# Patient Record
Sex: FEMALE | Race: WHITE | URBAN - METROPOLITAN AREA
[De-identification: names, ages, dates, MRNs, and addresses within clinical notes are randomized per-mention and may not be internally consistent; named-entity substitution may affect disease eponyms.]

---

## 2023-04-04 LAB
ALANINE AMINOTRANSFERASE (SGPT) (U/L) IN SER/PLAS: 9 U/L (ref 7–45)
ALBUMIN (G/DL) IN SER/PLAS: 4.3 G/DL (ref 3.4–5)
ALKALINE PHOSPHATASE (U/L) IN SER/PLAS: 54 U/L (ref 33–136)
ANION GAP IN SER/PLAS: 13 MMOL/L (ref 10–20)
ASPARTATE AMINOTRANSFERASE (SGOT) (U/L) IN SER/PLAS: 14 U/L (ref 9–39)
BILIRUBIN TOTAL (MG/DL) IN SER/PLAS: 0.6 MG/DL (ref 0–1.2)
CALCIUM (MG/DL) IN SER/PLAS: 9.6 MG/DL (ref 8.6–10.6)
CARBON DIOXIDE, TOTAL (MMOL/L) IN SER/PLAS: 26 MMOL/L (ref 21–32)
CHLORIDE (MMOL/L) IN SER/PLAS: 106 MMOL/L (ref 98–107)
CHOLESTEROL (MG/DL) IN SER/PLAS: 168 MG/DL (ref 0–199)
CHOLESTEROL IN HDL (MG/DL) IN SER/PLAS: 71.9 MG/DL
CHOLESTEROL/HDL RATIO: 2.3
CREATININE (MG/DL) IN SER/PLAS: 0.98 MG/DL (ref 0.5–1.05)
GFR FEMALE: 56 ML/MIN/1.73M2
GLUCOSE (MG/DL) IN SER/PLAS: 119 MG/DL (ref 74–99)
LDL: 71 MG/DL (ref 0–99)
POTASSIUM (MMOL/L) IN SER/PLAS: 4.4 MMOL/L (ref 3.5–5.3)
PROTEIN TOTAL: 6.7 G/DL (ref 6.4–8.2)
SODIUM (MMOL/L) IN SER/PLAS: 141 MMOL/L (ref 136–145)
THYROTROPIN (MIU/L) IN SER/PLAS BY DETECTION LIMIT <= 0.05 MIU/L: 0.52 MIU/L (ref 0.44–3.98)
TRIGLYCERIDE (MG/DL) IN SER/PLAS: 125 MG/DL (ref 0–149)
UREA NITROGEN (MG/DL) IN SER/PLAS: 23 MG/DL (ref 6–23)
VLDL: 25 MG/DL (ref 0–40)

## 2023-09-07 PROBLEM — R06.83 SNORING: Status: ACTIVE | Noted: 2023-09-07

## 2023-09-07 PROBLEM — R06.00 DYSPNEA: Status: ACTIVE | Noted: 2023-09-07

## 2023-09-07 PROBLEM — F32.A DEPRESSION: Status: ACTIVE | Noted: 2023-09-07

## 2023-09-07 PROBLEM — R73.9 HYPERGLYCEMIA: Status: ACTIVE | Noted: 2023-09-07

## 2023-09-07 PROBLEM — L57.0 ACTINIC KERATOSIS: Status: ACTIVE | Noted: 2023-09-07

## 2023-09-07 PROBLEM — R60.0 LEG EDEMA: Status: ACTIVE | Noted: 2023-09-07

## 2023-09-07 PROBLEM — E66.3 OVERWEIGHT (BMI 25.0-29.9): Status: ACTIVE | Noted: 2023-09-07

## 2023-09-07 PROBLEM — R73.09 IMPAIRED GLUCOSE METABOLISM: Status: ACTIVE | Noted: 2023-09-07

## 2023-09-07 PROBLEM — R06.01 ORTHOPNEA: Status: ACTIVE | Noted: 2023-09-07

## 2023-09-07 PROBLEM — D49.2: Status: ACTIVE | Noted: 2023-09-07

## 2023-09-07 PROBLEM — Z79.899 HIGH RISK MEDICATION USE: Status: ACTIVE | Noted: 2023-09-07

## 2023-09-07 PROBLEM — R42 VERTIGO: Status: ACTIVE | Noted: 2023-09-07

## 2023-09-07 PROBLEM — M81.0 OSTEOPOROSIS: Status: ACTIVE | Noted: 2023-09-07

## 2023-09-07 PROBLEM — E66.3 OVERWEIGHT WITH BODY MASS INDEX (BMI) OF 25 TO 25.9 IN ADULT: Status: ACTIVE | Noted: 2023-09-07

## 2023-09-07 PROBLEM — E78.5 HLD (HYPERLIPIDEMIA): Status: ACTIVE | Noted: 2023-09-07

## 2023-09-07 PROBLEM — M79.673 FOOT PAIN: Status: ACTIVE | Noted: 2023-09-07

## 2023-09-07 PROBLEM — E87.6 HYPOKALEMIA: Status: ACTIVE | Noted: 2023-09-07

## 2023-09-07 PROBLEM — E05.90 HYPERTHYROIDISM: Status: ACTIVE | Noted: 2023-09-07

## 2023-09-07 PROBLEM — E55.9 VITAMIN D DEFICIENCY: Status: ACTIVE | Noted: 2023-09-07

## 2023-09-07 PROBLEM — Z87.891 FORMER SMOKER: Status: ACTIVE | Noted: 2023-09-07

## 2023-09-07 PROBLEM — I10 HTN (HYPERTENSION): Status: ACTIVE | Noted: 2023-09-07

## 2023-09-07 RX ORDER — CHOLECALCIFEROL (VITAMIN D3) 125 MCG
1 CAPSULE ORAL DAILY
COMMUNITY

## 2023-09-07 RX ORDER — ATENOLOL 25 MG/1
1 TABLET ORAL DAILY
COMMUNITY
Start: 2022-10-06 | End: 2023-10-11 | Stop reason: SDUPTHER

## 2023-09-07 RX ORDER — AMLODIPINE BESYLATE 5 MG/1
1 TABLET ORAL DAILY
COMMUNITY
Start: 2021-05-14 | End: 2023-10-11 | Stop reason: ALTCHOICE

## 2023-09-07 RX ORDER — EZETIMIBE 10 MG/1
1 TABLET ORAL DAILY
COMMUNITY
Start: 2021-07-15 | End: 2023-10-11 | Stop reason: SDUPTHER

## 2023-09-07 RX ORDER — ATORVASTATIN CALCIUM 10 MG/1
1 TABLET, FILM COATED ORAL NIGHTLY
COMMUNITY
Start: 2021-05-14 | End: 2023-10-11 | Stop reason: SDUPTHER

## 2023-09-07 RX ORDER — SERTRALINE HYDROCHLORIDE 50 MG/1
1 TABLET, FILM COATED ORAL DAILY
COMMUNITY
Start: 2021-05-14 | End: 2023-10-11 | Stop reason: SDUPTHER

## 2023-09-07 RX ORDER — LOSARTAN POTASSIUM 50 MG/1
1 TABLET ORAL DAILY
COMMUNITY
Start: 2021-07-22 | End: 2023-10-11 | Stop reason: SDUPTHER

## 2023-10-02 ENCOUNTER — LAB (OUTPATIENT)
Dept: LAB | Facility: LAB | Age: 88
End: 2023-10-02
Payer: MEDICARE

## 2023-10-02 DIAGNOSIS — I10 ESSENTIAL (PRIMARY) HYPERTENSION: Primary | ICD-10-CM

## 2023-10-02 DIAGNOSIS — E55.9 VITAMIN D DEFICIENCY, UNSPECIFIED: ICD-10-CM

## 2023-10-02 LAB
25(OH)D3 SERPL-MCNC: 69 NG/ML (ref 30–100)
ALBUMIN SERPL BCP-MCNC: 4.3 G/DL (ref 3.4–5)
ALP SERPL-CCNC: 49 U/L (ref 33–136)
ALT SERPL W P-5'-P-CCNC: 11 U/L (ref 7–45)
ANION GAP SERPL CALC-SCNC: 17 MMOL/L (ref 10–20)
AST SERPL W P-5'-P-CCNC: 15 U/L (ref 9–39)
BILIRUB SERPL-MCNC: 0.5 MG/DL (ref 0–1.2)
BUN SERPL-MCNC: 31 MG/DL (ref 6–23)
CALCIUM SERPL-MCNC: 10.1 MG/DL (ref 8.6–10.6)
CHLORIDE SERPL-SCNC: 104 MMOL/L (ref 98–107)
CHOLEST SERPL-MCNC: 165 MG/DL (ref 0–199)
CHOLESTEROL/HDL RATIO: 2.4
CO2 SERPL-SCNC: 25 MMOL/L (ref 21–32)
CREAT SERPL-MCNC: 0.92 MG/DL (ref 0.5–1.05)
GFR SERPL CREATININE-BSD FRML MDRD: 60 ML/MIN/1.73M*2
GLUCOSE SERPL-MCNC: 106 MG/DL (ref 74–99)
HDLC SERPL-MCNC: 67.9 MG/DL
LDLC SERPL CALC-MCNC: 74 MG/DL (ref 140–190)
NON HDL CHOLESTEROL: 97 MG/DL (ref 0–149)
POTASSIUM SERPL-SCNC: 4.3 MMOL/L (ref 3.5–5.3)
PROT SERPL-MCNC: 7 G/DL (ref 6.4–8.2)
SODIUM SERPL-SCNC: 142 MMOL/L (ref 136–145)
TRIGL SERPL-MCNC: 117 MG/DL (ref 0–149)
URATE SERPL-MCNC: 6.7 MG/DL (ref 2.3–6.7)
VLDL: 23 MG/DL (ref 0–40)

## 2023-10-02 PROCEDURE — 36415 COLL VENOUS BLD VENIPUNCTURE: CPT

## 2023-10-11 ENCOUNTER — OFFICE VISIT (OUTPATIENT)
Dept: PRIMARY CARE | Facility: CLINIC | Age: 88
End: 2023-10-11
Payer: MEDICARE

## 2023-10-11 VITALS
OXYGEN SATURATION: 97 % | WEIGHT: 148.4 LBS | HEIGHT: 59 IN | HEART RATE: 61 BPM | SYSTOLIC BLOOD PRESSURE: 138 MMHG | DIASTOLIC BLOOD PRESSURE: 70 MMHG | BODY MASS INDEX: 29.92 KG/M2

## 2023-10-11 DIAGNOSIS — M54.6 CHRONIC BILATERAL THORACIC BACK PAIN: Primary | ICD-10-CM

## 2023-10-11 DIAGNOSIS — E78.2 MIXED HYPERLIPIDEMIA: ICD-10-CM

## 2023-10-11 DIAGNOSIS — M54.16 LUMBAR RADICULOPATHY: ICD-10-CM

## 2023-10-11 DIAGNOSIS — F41.9 ANXIETY: ICD-10-CM

## 2023-10-11 DIAGNOSIS — G89.29 CHRONIC BILATERAL THORACIC BACK PAIN: Primary | ICD-10-CM

## 2023-10-11 DIAGNOSIS — I10 PRIMARY HYPERTENSION: ICD-10-CM

## 2023-10-11 PROBLEM — E55.9 HYPOVITAMINOSIS D: Status: ACTIVE | Noted: 2023-10-11

## 2023-10-11 PROBLEM — Z79.899 DRUG THERAPY: Status: ACTIVE | Noted: 2023-10-11

## 2023-10-11 PROBLEM — R73.03 BORDERLINE DIABETIC: Status: ACTIVE | Noted: 2023-10-11

## 2023-10-11 PROBLEM — E78.5 HYPERLIPIDEMIA: Status: ACTIVE | Noted: 2019-01-22

## 2023-10-11 PROBLEM — H61.23 IMPACTED CERUMEN, BILATERAL: Status: ACTIVE | Noted: 2023-07-05

## 2023-10-11 PROBLEM — E66.3 OVER WEIGHT: Status: ACTIVE | Noted: 2023-10-11

## 2023-10-11 PROBLEM — E66.3 OVERWEIGHT: Status: ACTIVE | Noted: 2023-10-11

## 2023-10-11 PROBLEM — E06.3 AUTOIMMUNE HYPOTHYROIDISM: Status: ACTIVE | Noted: 2023-10-11

## 2023-10-11 PROBLEM — F32.A DEPRESSIVE DISORDER: Status: ACTIVE | Noted: 2023-10-11

## 2023-10-11 PROBLEM — M19.90 ARTHRITIS: Status: ACTIVE | Noted: 2023-10-11

## 2023-10-11 PROBLEM — D49.2 NEOPLASM OF SKIN: Status: ACTIVE | Noted: 2023-10-11

## 2023-10-11 PROBLEM — R60.0 EDEMA OF LOWER EXTREMITY: Status: ACTIVE | Noted: 2023-10-11

## 2023-10-11 PROBLEM — E04.0 DIFFUSE NONTOXIC GOITER: Status: ACTIVE | Noted: 2023-10-11

## 2023-10-11 PROCEDURE — 90662 IIV NO PRSV INCREASED AG IM: CPT | Performed by: STUDENT IN AN ORGANIZED HEALTH CARE EDUCATION/TRAINING PROGRAM

## 2023-10-11 PROCEDURE — 1170F FXNL STATUS ASSESSED: CPT | Performed by: STUDENT IN AN ORGANIZED HEALTH CARE EDUCATION/TRAINING PROGRAM

## 2023-10-11 PROCEDURE — G0008 ADMIN INFLUENZA VIRUS VAC: HCPCS | Performed by: STUDENT IN AN ORGANIZED HEALTH CARE EDUCATION/TRAINING PROGRAM

## 2023-10-11 PROCEDURE — G0439 PPPS, SUBSEQ VISIT: HCPCS | Performed by: STUDENT IN AN ORGANIZED HEALTH CARE EDUCATION/TRAINING PROGRAM

## 2023-10-11 PROCEDURE — 3075F SYST BP GE 130 - 139MM HG: CPT | Performed by: STUDENT IN AN ORGANIZED HEALTH CARE EDUCATION/TRAINING PROGRAM

## 2023-10-11 PROCEDURE — 3078F DIAST BP <80 MM HG: CPT | Performed by: STUDENT IN AN ORGANIZED HEALTH CARE EDUCATION/TRAINING PROGRAM

## 2023-10-11 PROCEDURE — 1159F MED LIST DOCD IN RCRD: CPT | Performed by: STUDENT IN AN ORGANIZED HEALTH CARE EDUCATION/TRAINING PROGRAM

## 2023-10-11 PROCEDURE — 99214 OFFICE O/P EST MOD 30 MIN: CPT | Performed by: STUDENT IN AN ORGANIZED HEALTH CARE EDUCATION/TRAINING PROGRAM

## 2023-10-11 RX ORDER — ATENOLOL 25 MG/1
25 TABLET ORAL DAILY
Qty: 90 TABLET | Refills: 1 | Status: SHIPPED | OUTPATIENT
Start: 2023-10-11 | End: 2024-02-19

## 2023-10-11 RX ORDER — ACETAMINOPHEN 500 MG
1 TABLET ORAL DAILY
COMMUNITY
Start: 2015-11-16 | End: 2023-12-08 | Stop reason: ALTCHOICE

## 2023-10-11 RX ORDER — SERTRALINE HYDROCHLORIDE 50 MG/1
50 TABLET, FILM COATED ORAL DAILY
Qty: 90 TABLET | Refills: 1 | Status: SHIPPED | OUTPATIENT
Start: 2023-10-11 | End: 2023-10-11 | Stop reason: SDUPTHER

## 2023-10-11 RX ORDER — EZETIMIBE 10 MG/1
10 TABLET ORAL DAILY
Qty: 90 TABLET | Refills: 1 | Status: SHIPPED | OUTPATIENT
Start: 2023-10-11 | End: 2023-10-11 | Stop reason: SDUPTHER

## 2023-10-11 RX ORDER — LOSARTAN POTASSIUM 50 MG/1
50 TABLET ORAL DAILY
Qty: 90 TABLET | Refills: 1 | Status: SHIPPED | OUTPATIENT
Start: 2023-10-11 | End: 2023-10-11 | Stop reason: SDUPTHER

## 2023-10-11 RX ORDER — LOSARTAN POTASSIUM 50 MG/1
50 TABLET ORAL DAILY
Qty: 90 TABLET | Refills: 1 | Status: SHIPPED | OUTPATIENT
Start: 2023-10-11 | End: 2024-02-19

## 2023-10-11 RX ORDER — ATENOLOL 25 MG/1
25 TABLET ORAL DAILY
Qty: 90 TABLET | Refills: 1 | Status: SHIPPED | OUTPATIENT
Start: 2023-10-11 | End: 2023-10-11 | Stop reason: SDUPTHER

## 2023-10-11 RX ORDER — ATORVASTATIN CALCIUM 10 MG/1
10 TABLET, FILM COATED ORAL NIGHTLY
Qty: 90 TABLET | Refills: 1 | Status: SHIPPED | OUTPATIENT
Start: 2023-10-11 | End: 2023-10-11 | Stop reason: SDUPTHER

## 2023-10-11 RX ORDER — EZETIMIBE 10 MG/1
10 TABLET ORAL DAILY
Qty: 90 TABLET | Refills: 1 | Status: SHIPPED | OUTPATIENT
Start: 2023-10-11 | End: 2024-05-06 | Stop reason: SDUPTHER

## 2023-10-11 RX ORDER — SERTRALINE HYDROCHLORIDE 50 MG/1
50 TABLET, FILM COATED ORAL DAILY
Qty: 90 TABLET | Refills: 1 | Status: SHIPPED | OUTPATIENT
Start: 2023-10-11 | End: 2024-02-19

## 2023-10-11 RX ORDER — ATORVASTATIN CALCIUM 10 MG/1
10 TABLET, FILM COATED ORAL NIGHTLY
Qty: 90 TABLET | Refills: 1 | Status: SHIPPED | OUTPATIENT
Start: 2023-10-11 | End: 2024-02-19

## 2023-10-11 ASSESSMENT — ENCOUNTER SYMPTOMS
COUGH: 0
FREQUENCY: 0
DYSURIA: 0
EYE PAIN: 0
VOMITING: 0
POLYDIPSIA: 0
HEADACHES: 0
DIARRHEA: 0
CONSTIPATION: 0
HALLUCINATIONS: 0
EYE DISCHARGE: 0
ABDOMINAL PAIN: 0
APPETITE CHANGE: 0
PALPITATIONS: 0
WHEEZING: 0
NAUSEA: 0
FEVER: 0
SHORTNESS OF BREATH: 0
OCCASIONAL FEELINGS OF UNSTEADINESS: 1
DEPRESSION: 1
HEMATURIA: 0
SORE THROAT: 0
MYALGIAS: 0
LOSS OF SENSATION IN FEET: 1
FATIGUE: 0

## 2023-10-11 ASSESSMENT — PATIENT HEALTH QUESTIONNAIRE - PHQ9
1. LITTLE INTEREST OR PLEASURE IN DOING THINGS: SEVERAL DAYS
10. IF YOU CHECKED OFF ANY PROBLEMS, HOW DIFFICULT HAVE THESE PROBLEMS MADE IT FOR YOU TO DO YOUR WORK, TAKE CARE OF THINGS AT HOME, OR GET ALONG WITH OTHER PEOPLE: NOT DIFFICULT AT ALL
SUM OF ALL RESPONSES TO PHQ9 QUESTIONS 1 AND 2: 2
2. FEELING DOWN, DEPRESSED OR HOPELESS: SEVERAL DAYS

## 2023-10-11 ASSESSMENT — ACTIVITIES OF DAILY LIVING (ADL)
TAKING_MEDICATION: INDEPENDENT
DRESSING: INDEPENDENT
BATHING: INDEPENDENT
GROCERY_SHOPPING: NEEDS ASSISTANCE
MANAGING_FINANCES: INDEPENDENT
DOING_HOUSEWORK: NEEDS ASSISTANCE

## 2023-10-11 NOTE — PROGRESS NOTES
Subjective   Reason for Visit: Geena Colorado is an 88 y.o. female here for a Medicare Wellness visit.               HPI    Patient Care Team:  Amy Alexis DO as PCP - General  Amy Alexis DO as PCP - [employer] PCP     Cancer:   -Menopause age: 56yo   -Colon (Age > 50): Denies  -PAP (Age > 20): Denies  -Breast (Age > 40): Denies  -Lung (Age 55-80, 30 pack year, cessation within 15 years): Former smoker    Vaccination  Tetnus: Up to date  Shingle (Age > 50): Up to date  Pneumonia 13 & 23 (Age 65): Up to date  Flu: Up to date    Tobacco: Denies  Alcohol: Social  Diabetes:Denies  Lipids: HLD; on Zetia  Cognitive: Denies any Cognitive deficits. No cognitive deficits noted.     Also here for follow-up of hypertension, hyperlipidemia, anxiety depression.  Thought her mood was doing better and subsequently discontinued sertraline however wants to go back on it.  States that there has been some deaths in the family which is making her mood go down slightly.  Would like to go back on sertraline.  Denies any chest pain, shortness breath, headaches.  Has been tolerating her medications well.  Blood work has been reviewed.  Within acceptable limits.  No other concerns today.  Denies any chest pain, shortness of breath, headaches.  Does have some baseline dizziness about the same as previous.  Also some associated radicular symptoms of low back pain.    Review of Systems   Constitutional:  Negative for appetite change, fatigue and fever.   HENT:  Negative for congestion, ear discharge, ear pain, hearing loss and sore throat.    Eyes:  Negative for pain and discharge.   Respiratory:  Negative for cough, shortness of breath and wheezing.    Cardiovascular:  Negative for chest pain, palpitations and leg swelling.   Gastrointestinal:  Negative for abdominal pain, constipation, diarrhea, nausea and vomiting.   Endocrine: Negative for cold intolerance, heat intolerance and polydipsia.   Genitourinary:  Negative  for dysuria, frequency and hematuria.   Musculoskeletal:  Negative for gait problem and myalgias.   Skin:  Negative for rash.   Neurological:  Negative for syncope and headaches.   Psychiatric/Behavioral:  Negative for hallucinations and suicidal ideas.        Objective   Vitals:  There were no vitals taken for this visit.      Physical Exam  Constitutional:       Appearance: Normal appearance.   HENT:      Head: Normocephalic and atraumatic.      Right Ear: External ear normal.      Left Ear: External ear normal.      Nose: Nose normal.      Mouth/Throat:      Mouth: Mucous membranes are moist.   Eyes:      Extraocular Movements: Extraocular movements intact.      Conjunctiva/sclera: Conjunctivae normal.      Pupils: Pupils are equal, round, and reactive to light.   Cardiovascular:      Rate and Rhythm: Normal rate and regular rhythm.      Pulses: Normal pulses.      Heart sounds: Normal heart sounds.   Pulmonary:      Effort: Pulmonary effort is normal.      Breath sounds: Normal breath sounds.   Abdominal:      General: Abdomen is flat.      Palpations: Abdomen is soft.   Neurological:      General: No focal deficit present.      Mental Status: She is alert and oriented to person, place, and time.   Psychiatric:         Mood and Affect: Mood normal.         Behavior: Behavior normal.       Assessment/Plan   1 year follow-up for annual physical.  Preventive screenings as stated above.  6-month follow-up for chronic condition review.  We will do some x-rays of the low back.  I have concerns that it is most likely a nerve impingement leading to her balance.  Once confirmation I will refer patient to physical medicine.  If work-up is negative consider referring to neurology instead.  Problem List Items Addressed This Visit    None

## 2023-10-19 ENCOUNTER — TELEPHONE (OUTPATIENT)
Dept: PRIMARY CARE | Facility: CLINIC | Age: 88
End: 2023-10-19
Payer: MEDICARE

## 2023-10-19 DIAGNOSIS — Z78.0 POST-MENOPAUSAL: ICD-10-CM

## 2023-10-19 DIAGNOSIS — Z12.31 ENCOUNTER FOR SCREENING MAMMOGRAM FOR MALIGNANT NEOPLASM OF BREAST: Primary | ICD-10-CM

## 2023-12-08 ENCOUNTER — TELEMEDICINE (OUTPATIENT)
Dept: PRIMARY CARE | Facility: CLINIC | Age: 88
End: 2023-12-08
Payer: MEDICARE

## 2023-12-08 DIAGNOSIS — U07.1 COVID-19: Primary | ICD-10-CM

## 2023-12-08 PROCEDURE — 99442 PR PHYS/QHP TELEPHONE EVALUATION 11-20 MIN: CPT | Performed by: STUDENT IN AN ORGANIZED HEALTH CARE EDUCATION/TRAINING PROGRAM

## 2023-12-08 RX ORDER — NIRMATRELVIR AND RITONAVIR 300-100 MG
3 KIT ORAL 2 TIMES DAILY
Qty: 30 TABLET | Refills: 0 | Status: SHIPPED | OUTPATIENT
Start: 2023-12-08 | End: 2023-12-13

## 2023-12-08 RX ORDER — BENZONATATE 200 MG/1
200 CAPSULE ORAL 3 TIMES DAILY PRN
Qty: 30 CAPSULE | Refills: 0 | Status: SHIPPED | OUTPATIENT
Start: 2023-12-08 | End: 2023-12-18

## 2023-12-08 ASSESSMENT — ENCOUNTER SYMPTOMS
EYE PAIN: 0
PALPITATIONS: 0
APPETITE CHANGE: 0
COUGH: 1
SHORTNESS OF BREATH: 0
FEVER: 0
MYALGIAS: 0
HALLUCINATIONS: 0
WHEEZING: 0
HEADACHES: 0
EYE DISCHARGE: 0
FREQUENCY: 0
RHINORRHEA: 1
CONSTIPATION: 0
VOMITING: 0
ABDOMINAL PAIN: 0
DIARRHEA: 0
NAUSEA: 0
FATIGUE: 0
DYSURIA: 0
HEMATURIA: 0
SORE THROAT: 0
POLYDIPSIA: 0

## 2023-12-08 NOTE — PROGRESS NOTES
Subjective   Patient ID: Geena Colorado is a 88 y.o. female who presents for covid positive.    Patient tested positive for COVID on 12/7/2023.  Symptoms started on 12/6/2023.  Has been sleeping well.  Denies any chest pain shortness of breath.  Slightly better today.  However due to the week and has some concerns that may get worse.  Denies any other sick contact.   lives with her however he is currently asymptomatic at this point in time.  Has not been tested.  Has symptoms of cough, congestion, rhinorrhea.           Review of Systems   Constitutional:  Negative for appetite change, fatigue and fever.   HENT:  Positive for congestion and rhinorrhea. Negative for ear discharge, ear pain, hearing loss and sore throat.    Eyes:  Negative for pain and discharge.   Respiratory:  Positive for cough. Negative for shortness of breath and wheezing.    Cardiovascular:  Negative for chest pain, palpitations and leg swelling.   Gastrointestinal:  Negative for abdominal pain, constipation, diarrhea, nausea and vomiting.   Endocrine: Negative for cold intolerance, heat intolerance and polydipsia.   Genitourinary:  Negative for dysuria, frequency and hematuria.   Musculoskeletal:  Negative for gait problem and myalgias.   Skin:  Negative for rash.   Neurological:  Negative for syncope and headaches.   Psychiatric/Behavioral:  Negative for hallucinations and suicidal ideas.        Objective   There were no vitals taken for this visit.    Physical Exam  Eyes:      General:         Right eye: Right eye discharge: tessalon.   Neurological:      Mental Status: She is alert.   Psychiatric:         Mood and Affect: Mood normal.       Assessment/Plan   Start the patient on Paxlovid.  Advised patient if she gets chest pain or shortness of breath please go to the emergency room.  Patient verbalized understand the plan with no further questions.  If symptoms are persistent or worsen we will follow-up in 1 week for reevaluation for  proper physical exam.  May need to consider imaging studies at that point in time.

## 2024-02-19 DIAGNOSIS — I10 PRIMARY HYPERTENSION: ICD-10-CM

## 2024-02-19 DIAGNOSIS — E78.2 MIXED HYPERLIPIDEMIA: ICD-10-CM

## 2024-02-19 DIAGNOSIS — F41.9 ANXIETY: ICD-10-CM

## 2024-02-19 RX ORDER — SERTRALINE HYDROCHLORIDE 50 MG/1
50 TABLET, FILM COATED ORAL DAILY
Qty: 90 TABLET | Refills: 3 | Status: SHIPPED | OUTPATIENT
Start: 2024-02-19 | End: 2024-05-06 | Stop reason: SINTOL

## 2024-02-19 RX ORDER — LOSARTAN POTASSIUM 50 MG/1
50 TABLET ORAL DAILY
Qty: 90 TABLET | Refills: 3 | Status: SHIPPED | OUTPATIENT
Start: 2024-02-19

## 2024-02-19 RX ORDER — ATENOLOL 25 MG/1
25 TABLET ORAL DAILY
Qty: 90 TABLET | Refills: 3 | Status: SHIPPED | OUTPATIENT
Start: 2024-02-19

## 2024-02-19 RX ORDER — ATORVASTATIN CALCIUM 10 MG/1
10 TABLET, FILM COATED ORAL NIGHTLY
Qty: 90 TABLET | Refills: 3 | Status: SHIPPED | OUTPATIENT
Start: 2024-02-19

## 2024-04-11 ENCOUNTER — APPOINTMENT (OUTPATIENT)
Dept: PRIMARY CARE | Facility: CLINIC | Age: 89
End: 2024-04-11
Payer: MEDICARE

## 2024-04-15 ENCOUNTER — APPOINTMENT (OUTPATIENT)
Dept: PRIMARY CARE | Facility: CLINIC | Age: 89
End: 2024-04-15
Payer: MEDICARE

## 2024-04-29 ENCOUNTER — TELEPHONE (OUTPATIENT)
Dept: PRIMARY CARE | Facility: CLINIC | Age: 89
End: 2024-04-29
Payer: MEDICARE

## 2024-05-06 ENCOUNTER — OFFICE VISIT (OUTPATIENT)
Dept: PRIMARY CARE | Facility: CLINIC | Age: 89
End: 2024-05-06
Payer: MEDICARE

## 2024-05-06 VITALS
DIASTOLIC BLOOD PRESSURE: 81 MMHG | TEMPERATURE: 98 F | SYSTOLIC BLOOD PRESSURE: 171 MMHG | HEART RATE: 68 BPM | HEIGHT: 59 IN | WEIGHT: 147 LBS | BODY MASS INDEX: 29.64 KG/M2

## 2024-05-06 DIAGNOSIS — E78.2 MIXED HYPERLIPIDEMIA: ICD-10-CM

## 2024-05-06 DIAGNOSIS — F32.A DEPRESSION, UNSPECIFIED DEPRESSION TYPE: Primary | ICD-10-CM

## 2024-05-06 DIAGNOSIS — Z87.891 FORMER SMOKER: ICD-10-CM

## 2024-05-06 DIAGNOSIS — R42 VERTIGO: ICD-10-CM

## 2024-05-06 DIAGNOSIS — Z13.29 THYROID DISORDER SCREENING: ICD-10-CM

## 2024-05-06 DIAGNOSIS — E55.9 VITAMIN D DEFICIENCY: ICD-10-CM

## 2024-05-06 DIAGNOSIS — R05.1 ACUTE COUGH: ICD-10-CM

## 2024-05-06 DIAGNOSIS — I10 PRIMARY HYPERTENSION: ICD-10-CM

## 2024-05-06 PROCEDURE — 99214 OFFICE O/P EST MOD 30 MIN: CPT | Performed by: INTERNAL MEDICINE

## 2024-05-06 PROCEDURE — 1160F RVW MEDS BY RX/DR IN RCRD: CPT | Performed by: INTERNAL MEDICINE

## 2024-05-06 PROCEDURE — 3079F DIAST BP 80-89 MM HG: CPT | Performed by: INTERNAL MEDICINE

## 2024-05-06 PROCEDURE — 3077F SYST BP >= 140 MM HG: CPT | Performed by: INTERNAL MEDICINE

## 2024-05-06 PROCEDURE — 1159F MED LIST DOCD IN RCRD: CPT | Performed by: INTERNAL MEDICINE

## 2024-05-06 RX ORDER — ESCITALOPRAM OXALATE 5 MG/1
5 TABLET ORAL DAILY
Qty: 90 TABLET | Refills: 3 | Status: SHIPPED | OUTPATIENT
Start: 2024-05-06 | End: 2025-05-06

## 2024-05-06 RX ORDER — DOXYCYCLINE 100 MG/1
100 CAPSULE ORAL 2 TIMES DAILY
COMMUNITY
Start: 2024-05-01 | End: 2024-05-11

## 2024-05-06 RX ORDER — PREDNISONE 20 MG/1
20 TABLET ORAL 2 TIMES DAILY
COMMUNITY
Start: 2024-05-01 | End: 2024-05-06 | Stop reason: ALTCHOICE

## 2024-05-06 RX ORDER — BENZONATATE 200 MG/1
200 CAPSULE ORAL
COMMUNITY
Start: 2024-05-01 | End: 2024-05-06

## 2024-05-06 RX ORDER — EZETIMIBE 10 MG/1
10 TABLET ORAL DAILY
Qty: 90 TABLET | Refills: 3 | Status: SHIPPED | OUTPATIENT
Start: 2024-05-06 | End: 2025-05-06

## 2024-05-06 RX ORDER — PROMETHAZINE HYDROCHLORIDE AND DEXTROMETHORPHAN HYDROBROMIDE 6.25; 15 MG/5ML; MG/5ML
5 SYRUP ORAL
COMMUNITY
Start: 2024-05-01

## 2024-05-06 ASSESSMENT — ENCOUNTER SYMPTOMS
COUGH: 1
DIZZINESS: 1
CHILLS: 0
SORE THROAT: 0
DEPRESSION: 0
LOSS OF SENSATION IN FEET: 0
SHORTNESS OF BREATH: 0
FEVER: 0
DYSURIA: 0
OCCASIONAL FEELINGS OF UNSTEADINESS: 0

## 2024-05-06 ASSESSMENT — PATIENT HEALTH QUESTIONNAIRE - PHQ9
10. IF YOU CHECKED OFF ANY PROBLEMS, HOW DIFFICULT HAVE THESE PROBLEMS MADE IT FOR YOU TO DO YOUR WORK, TAKE CARE OF THINGS AT HOME, OR GET ALONG WITH OTHER PEOPLE: SOMEWHAT DIFFICULT
2. FEELING DOWN, DEPRESSED OR HOPELESS: SEVERAL DAYS
SUM OF ALL RESPONSES TO PHQ9 QUESTIONS 1 AND 2: 1
1. LITTLE INTEREST OR PLEASURE IN DOING THINGS: NOT AT ALL

## 2024-05-06 NOTE — PROGRESS NOTES
"Subjective   Patient ID: Geena Colorado is a 88 y.o. female who presents for Establish Care and Cough (X 5 days ).    Cough  Pertinent negatives include no chest pain, chills, fever, rash, sore throat or shortness of breath.        Review of Systems   Constitutional:  Negative for chills and fever.   HENT:  Negative for sore throat.    Respiratory:  Positive for cough. Negative for shortness of breath.    Cardiovascular:  Negative for chest pain.   Genitourinary:  Negative for dysuria.   Skin:  Negative for rash.   Neurological:  Positive for dizziness.       Objective   BP (!) 178/92   Pulse 68   Temp 36.7 °C (98 °F) (Temporal)   Ht 1.486 m (4' 10.5\")   Wt 66.7 kg (147 lb)   BMI 30.20 kg/m²     Physical Exam  Vitals and nursing note reviewed.       Assessment/Plan   Problem List Items Addressed This Visit             ICD-10-CM    Depression - Primary F32.A    Relevant Medications    escitalopram (Lexapro) 5 mg tablet    Vertigo R42    Relevant Orders    Referral to Physical Therapy    Vitamin D deficiency E55.9    Relevant Orders    Vitamin D 25-Hydroxy,Total (for eval of Vitamin D levels)    Former smoker Z87.891    Hyperlipidemia E78.5    Relevant Medications    ezetimibe (Zetia) 10 mg tablet    Other Relevant Orders    CBC and Auto Differential    Comprehensive metabolic panel    Lipid panel    High blood pressure I10    Relevant Orders    CBC and Auto Differential    Thyroid disorder screening Z13.29    Relevant Orders    TSH with reflex to Free T4 if abnormal    Acute cough R05.1          "

## 2024-05-08 ENCOUNTER — TELEPHONE (OUTPATIENT)
Dept: PRIMARY CARE | Facility: CLINIC | Age: 89
End: 2024-05-08
Payer: MEDICARE

## 2024-05-08 NOTE — TELEPHONE ENCOUNTER
Monitoring bp     161/94 seems to go higher in evening  181/96 last evening     They will continue to monitor and let us know on Friday what the trend is. She is still coughing a lot and was coughing at the time of the highest reading       [phone] hans

## 2024-05-17 NOTE — TELEPHONE ENCOUNTER
Spoke with patient. She states that her blood pressure was down in the 148/78. She has not taken it in a couple of days. I asked that she take it while I was on the phone and it is 143/84.

## 2024-05-23 ENCOUNTER — TELEPHONE (OUTPATIENT)
Dept: CARDIOLOGY | Facility: CLINIC | Age: 89
End: 2024-05-23
Payer: MEDICARE

## 2024-05-23 NOTE — TELEPHONE ENCOUNTER
Bp readings  05/18/24 143/80 9:10am                  148/76 1:30pm                   139/71 minutes later                   143/73 evening time  05/19/2024                    139/86  9:00am                     148/77 3:00pm                     128/76 6:56pm  05/20/2024                       122/79 7:40am                       120/63 1:30pm                        142/80 7:00pm  05/21/2024                       126/76 8:00am                        133/79 2:33pm  05/22/2024                        133/73 8:38pm  05/23/2024                         129/68 1:28pm

## 2024-05-23 NOTE — TELEPHONE ENCOUNTER
Overall these are good readings and generally well controlled blood pressures.  I think we will hold off on making any changes at this time unless she notices a change. thanks

## 2024-05-31 PROBLEM — Z13.29 THYROID DISORDER SCREENING: Status: ACTIVE | Noted: 2024-05-31

## 2024-05-31 PROBLEM — R05.1 ACUTE COUGH: Status: ACTIVE | Noted: 2024-05-31

## 2024-06-04 ENCOUNTER — TELEPHONE (OUTPATIENT)
Dept: PRIMARY CARE | Facility: CLINIC | Age: 89
End: 2024-06-04
Payer: MEDICARE

## 2024-06-18 DIAGNOSIS — E55.9 VITAMIN D DEFICIENCY: ICD-10-CM

## 2024-06-18 DIAGNOSIS — I10 PRIMARY HYPERTENSION: ICD-10-CM

## 2024-06-18 DIAGNOSIS — E05.90 SUBCLINICAL HYPERTHYROIDISM: ICD-10-CM

## 2024-06-18 DIAGNOSIS — E78.2 MIXED HYPERLIPIDEMIA: Primary | ICD-10-CM

## 2024-10-17 LAB
NON-UH HIE A/G RATIO: 1.2
NON-UH HIE ALB: 3.7 G/DL (ref 3.4–5)
NON-UH HIE ALK PHOS: 63 UNIT/L (ref 45–117)
NON-UH HIE BILIRUBIN, TOTAL: 0.7 MG/DL (ref 0.3–1.2)
NON-UH HIE BUN/CREAT RATIO: 24
NON-UH HIE BUN: 24 MG/DL (ref 9–23)
NON-UH HIE CALCIUM: 9.7 MG/DL (ref 8.7–10.4)
NON-UH HIE CALCULATED LDL CHOLESTEROL: 66 MG/DL (ref 60–130)
NON-UH HIE CALCULATED OSMOLALITY: 287 MOSM/KG (ref 275–295)
NON-UH HIE CHLORIDE: 107 MMOL/L (ref 98–107)
NON-UH HIE CHOLESTEROL: 155 MG/DL (ref 100–200)
NON-UH HIE CO2, VENOUS: 25 MMOL/L (ref 20–31)
NON-UH HIE CREATININE: 1 MG/DL (ref 0.5–0.8)
NON-UH HIE FREE T4: 1.21 NG/DL (ref 0.89–1.76)
NON-UH HIE GFR AA: >60
NON-UH HIE GLOBULIN: 3.1 G/DL
NON-UH HIE GLOMERULAR FILTRATION RATE: 52 ML/MIN/1.73M?
NON-UH HIE GLUCOSE: 120 MG/DL (ref 74–106)
NON-UH HIE GOT: 16 UNIT/L (ref 15–37)
NON-UH HIE GPT: 10 UNIT/L (ref 10–49)
NON-UH HIE HDL CHOLESTEROL: 69 MG/DL (ref 40–60)
NON-UH HIE K: 4.4 MMOL/L (ref 3.5–5.1)
NON-UH HIE NA: 141 MMOL/L (ref 135–145)
NON-UH HIE TOTAL CHOL/HDL CHOL RATIO: 2.2
NON-UH HIE TOTAL PROTEIN: 6.8 G/DL (ref 5.7–8.2)
NON-UH HIE TRIGLYCERIDES: 101 MG/DL (ref 30–150)
NON-UH HIE TSH: 0.42 UIU/ML (ref 0.55–4.78)
NON-UH HIE VIT D 25: 49 NG/ML

## 2024-11-04 ENCOUNTER — APPOINTMENT (OUTPATIENT)
Dept: PRIMARY CARE | Facility: CLINIC | Age: 89
End: 2024-11-04
Payer: MEDICARE

## 2024-11-04 VITALS
HEIGHT: 58 IN | DIASTOLIC BLOOD PRESSURE: 67 MMHG | SYSTOLIC BLOOD PRESSURE: 153 MMHG | WEIGHT: 147.8 LBS | BODY MASS INDEX: 31.02 KG/M2 | TEMPERATURE: 97.5 F | HEART RATE: 64 BPM

## 2024-11-04 DIAGNOSIS — Z23 ENCOUNTER FOR IMMUNIZATION: ICD-10-CM

## 2024-11-04 DIAGNOSIS — E55.9 VITAMIN D DEFICIENCY: ICD-10-CM

## 2024-11-04 DIAGNOSIS — R73.09 IMPAIRED GLUCOSE METABOLISM: ICD-10-CM

## 2024-11-04 DIAGNOSIS — E78.2 MIXED HYPERLIPIDEMIA: ICD-10-CM

## 2024-11-04 DIAGNOSIS — E05.90 SUBCLINICAL HYPERTHYROIDISM: ICD-10-CM

## 2024-11-04 DIAGNOSIS — Z00.00 HEALTH MAINTENANCE EXAMINATION: Primary | ICD-10-CM

## 2024-11-04 DIAGNOSIS — Z87.891 FORMER SMOKER: ICD-10-CM

## 2024-11-04 DIAGNOSIS — E04.2 MULTIPLE THYROID NODULES: ICD-10-CM

## 2024-11-04 DIAGNOSIS — Z78.0 MENOPAUSE PRESENT: ICD-10-CM

## 2024-11-04 DIAGNOSIS — I10 PRIMARY HYPERTENSION: ICD-10-CM

## 2024-11-04 DIAGNOSIS — M81.0 OSTEOPOROSIS, UNSPECIFIED OSTEOPOROSIS TYPE, UNSPECIFIED PATHOLOGICAL FRACTURE PRESENCE: ICD-10-CM

## 2024-11-04 PROCEDURE — 1036F TOBACCO NON-USER: CPT | Performed by: INTERNAL MEDICINE

## 2024-11-04 PROCEDURE — 99397 PER PM REEVAL EST PAT 65+ YR: CPT | Performed by: INTERNAL MEDICINE

## 2024-11-04 PROCEDURE — G0009 ADMIN PNEUMOCOCCAL VACCINE: HCPCS | Performed by: INTERNAL MEDICINE

## 2024-11-04 PROCEDURE — 1158F ADVNC CARE PLAN TLK DOCD: CPT | Performed by: INTERNAL MEDICINE

## 2024-11-04 PROCEDURE — G0439 PPPS, SUBSEQ VISIT: HCPCS | Performed by: INTERNAL MEDICINE

## 2024-11-04 PROCEDURE — 1170F FXNL STATUS ASSESSED: CPT | Performed by: INTERNAL MEDICINE

## 2024-11-04 PROCEDURE — 90677 PCV20 VACCINE IM: CPT | Performed by: INTERNAL MEDICINE

## 2024-11-04 PROCEDURE — 3078F DIAST BP <80 MM HG: CPT | Performed by: INTERNAL MEDICINE

## 2024-11-04 PROCEDURE — 1123F ACP DISCUSS/DSCN MKR DOCD: CPT | Performed by: INTERNAL MEDICINE

## 2024-11-04 PROCEDURE — 1159F MED LIST DOCD IN RCRD: CPT | Performed by: INTERNAL MEDICINE

## 2024-11-04 PROCEDURE — 99214 OFFICE O/P EST MOD 30 MIN: CPT | Performed by: INTERNAL MEDICINE

## 2024-11-04 PROCEDURE — 1160F RVW MEDS BY RX/DR IN RCRD: CPT | Performed by: INTERNAL MEDICINE

## 2024-11-04 PROCEDURE — 3077F SYST BP >= 140 MM HG: CPT | Performed by: INTERNAL MEDICINE

## 2024-11-04 RX ORDER — AMLODIPINE BESYLATE 5 MG/1
5 TABLET ORAL DAILY
Qty: 90 TABLET | Refills: 3 | Status: SHIPPED | OUTPATIENT
Start: 2024-11-04 | End: 2025-11-04

## 2024-11-04 RX ORDER — AMLODIPINE BESYLATE 5 MG/1
5 TABLET ORAL DAILY
Qty: 30 TABLET | Refills: 11 | Status: SHIPPED | OUTPATIENT
Start: 2024-11-04 | End: 2024-11-04

## 2024-11-04 ASSESSMENT — PATIENT HEALTH QUESTIONNAIRE - PHQ9
SUM OF ALL RESPONSES TO PHQ9 QUESTIONS 1 AND 2: 1
10. IF YOU CHECKED OFF ANY PROBLEMS, HOW DIFFICULT HAVE THESE PROBLEMS MADE IT FOR YOU TO DO YOUR WORK, TAKE CARE OF THINGS AT HOME, OR GET ALONG WITH OTHER PEOPLE: NOT DIFFICULT AT ALL
SUM OF ALL RESPONSES TO PHQ9 QUESTIONS 1 AND 2: 0
1. LITTLE INTEREST OR PLEASURE IN DOING THINGS: NOT AT ALL
2. FEELING DOWN, DEPRESSED OR HOPELESS: NOT AT ALL
1. LITTLE INTEREST OR PLEASURE IN DOING THINGS: NOT AT ALL
2. FEELING DOWN, DEPRESSED OR HOPELESS: SEVERAL DAYS

## 2024-11-04 ASSESSMENT — ENCOUNTER SYMPTOMS
DIZZINESS: 1
ABDOMINAL PAIN: 0
DYSURIA: 0
SHORTNESS OF BREATH: 0
VOMITING: 0
BLOOD IN STOOL: 0
LIGHT-HEADEDNESS: 0
SORE THROAT: 0
FEVER: 0
AGITATION: 0
NAUSEA: 0
COUGH: 0
PALPITATIONS: 0
HEMATURIA: 0
CHILLS: 0
DIARRHEA: 0

## 2024-11-04 ASSESSMENT — ACTIVITIES OF DAILY LIVING (ADL)
DOING_HOUSEWORK: NEEDS ASSISTANCE
DRESSING: INDEPENDENT
MANAGING_FINANCES: INDEPENDENT
TAKING_MEDICATION: INDEPENDENT
BATHING: INDEPENDENT
GROCERY_SHOPPING: INDEPENDENT

## 2024-11-04 NOTE — ASSESSMENT & PLAN NOTE
Orders:    CBC and Auto Differential; Future    Comprehensive metabolic panel; Future    Lipid panel; Future

## 2024-11-04 NOTE — ASSESSMENT & PLAN NOTE
Orders:    CBC and Auto Differential; Future    Comprehensive metabolic panel; Future    amLODIPine (Norvasc) 5 mg tablet; Take 1 tablet (5 mg) by mouth once daily.

## 2024-11-04 NOTE — PROGRESS NOTES
Subjective   Reason for Visit: Geena Colorado is an 89 y.o. female here for a Medicare Wellness visit.     Past Medical, Surgical, and Family History reviewed and updated in chart.    Reviewed all medications by prescribing practitioner or clinical pharmacist (such as prescriptions, OTCs, herbal therapies and supplements) and documented in the medical record.    HPI patient is 89-year-old female presents to the office today for annual wellness visit.  She is up-to-date on age and gender recommended screening with exception of DEXA scan which she would like to pursue.  She is up-to-date on age and gender recommended immunizations exception of pneumonia vaccine which will be updated today also due for shingles vaccine, RSV and COVID-19 which is recommended get her pharmacy.  Has had some issues with elevated blood pressure and would like to go back on amlodipine that she was on previously.  States she was on 5 mg of amlodipine in the past was taken off the medication.  She thinks a higher dose caused some swelling.  Denies any fever, chills, chest pain shortness of breath, nausea or vomiting.  Has issues with dizziness and balance.  Was referred to outpatient balance and vestibular therapy but did not pursue it.  No syncopal episodes.  She also brought a copy of an ultrasound report from 2021 and believes she is due for repeat surveillance of thyroid nodules.      Patient Care Team:  Osmar Hinds DO as PCP - General (Internal Medicine)     Review of Systems   Constitutional:  Negative for chills and fever.   HENT:  Negative for sore throat.    Eyes:  Negative for visual disturbance.   Respiratory:  Negative for cough and shortness of breath.    Cardiovascular:  Negative for chest pain, palpitations and leg swelling.   Gastrointestinal:  Negative for abdominal pain, blood in stool, diarrhea, nausea and vomiting.   Genitourinary:  Negative for dysuria and hematuria.   Skin:  Negative for rash.   Neurological:   "Positive for dizziness. Negative for syncope and light-headedness.   Psychiatric/Behavioral:  Negative for agitation.        Objective   Vitals:  /67 (BP Location: Left arm, Patient Position: Sitting, BP Cuff Size: Adult)   Pulse 64   Temp 36.4 °C (97.5 °F)   Ht 1.473 m (4' 10\")   Wt 67 kg (147 lb 12.8 oz)   BMI 30.89 kg/m²       Physical Exam  Vitals and nursing note reviewed.   Constitutional:       General: She is not in acute distress.     Appearance: Normal appearance. She is obese. She is not ill-appearing, toxic-appearing or diaphoretic.   HENT:      Head: Normocephalic and atraumatic.      Mouth/Throat:      Mouth: Mucous membranes are moist.      Pharynx: Oropharynx is clear. No oropharyngeal exudate.   Eyes:      Pupils: Pupils are equal, round, and reactive to light.   Cardiovascular:      Rate and Rhythm: Normal rate and regular rhythm.      Heart sounds: Normal heart sounds.   Pulmonary:      Effort: Pulmonary effort is normal. No respiratory distress.      Breath sounds: Normal breath sounds. No wheezing, rhonchi or rales.   Abdominal:      General: Bowel sounds are normal. There is no distension.      Palpations: Abdomen is soft.      Tenderness: There is no abdominal tenderness.   Musculoskeletal:      Cervical back: Neck supple.      Right lower leg: No edema.      Left lower leg: No edema.   Lymphadenopathy:      Cervical: No cervical adenopathy.   Skin:     General: Skin is warm and dry.      Coloration: Skin is not jaundiced or pale.      Findings: No rash.   Neurological:      General: No focal deficit present.      Mental Status: She is alert and oriented to person, place, and time.      Cranial Nerves: No cranial nerve deficit.   Psychiatric:         Mood and Affect: Mood normal.         Behavior: Behavior normal.         Thought Content: Thought content normal.         Judgment: Judgment normal.         Assessment & Plan  Multiple thyroid nodules    Orders:    US thyroid; " Future    Primary hypertension    Orders:    CBC and Auto Differential; Future    Comprehensive metabolic panel; Future    amLODIPine (Norvasc) 5 mg tablet; Take 1 tablet (5 mg) by mouth once daily.    Mixed hyperlipidemia    Orders:    CBC and Auto Differential; Future    Comprehensive metabolic panel; Future    Lipid panel; Future    Vitamin D deficiency    Orders:    Vitamin D 25-Hydroxy,Total (for eval of Vitamin D levels); Future    Impaired glucose metabolism    Orders:    Hemoglobin A1C; Future    Subclinical hyperthyroidism    Orders:    TSH with reflex to Free T4 if abnormal; Future    Encounter for immunization    Orders:    Pneumococcal conjugate vaccine, 20-valent (PREVNAR 20)    Menopause present    Orders:    XR DEXA bone density; Future    Osteoporosis, unspecified osteoporosis type, unspecified pathological fracture presence    Orders:    XR DEXA bone density; Future    Health maintenance examination         Former smoker          Health maintenance examination: Patient is up-to-date on age and gender recommended screening exception of DEXA scan which has been ordered.  She is due for pneumonia vaccine with Prevnar 20 which will be updated also recommend to get RSV and shingles vaccine at her pharmacy    Multiple thyroid nodules: Will repeat ultrasound of thyroid for further evaluation she does have evidence of subclinical hyperthyroidism    Hypertension: Chronic, uncontrolled will increase her medication regimen to include amlodipine 5 mg daily continue the rest of her current medications atenolol and losartan    Hyperlipidemia: Chronic, stable patient will be continued on atorvastatin and Zetia    Vitamin D deficiency: Chronic, stable continue current medication regimen    Impaired glucose metabolism: Will check hemoglobin A1c advised against breads, pastas rice potatoes    Subclinical hyperthyroidism: No signs or symptoms of hyperthyroidism at this time we will continue to monitor carefully.   Repeat TSH has been ordered for 6 months

## 2024-12-27 DIAGNOSIS — E78.2 MIXED HYPERLIPIDEMIA: ICD-10-CM

## 2024-12-27 DIAGNOSIS — I10 PRIMARY HYPERTENSION: ICD-10-CM

## 2025-01-02 RX ORDER — ATORVASTATIN CALCIUM 10 MG/1
10 TABLET, FILM COATED ORAL NIGHTLY
Qty: 90 TABLET | Refills: 3 | Status: SHIPPED | OUTPATIENT
Start: 2025-01-02

## 2025-01-02 RX ORDER — ATENOLOL 25 MG/1
25 TABLET ORAL DAILY
Qty: 90 TABLET | Refills: 3 | Status: SHIPPED | OUTPATIENT
Start: 2025-01-02

## 2025-01-02 RX ORDER — LOSARTAN POTASSIUM 50 MG/1
50 TABLET ORAL DAILY
Qty: 90 TABLET | Refills: 3 | Status: SHIPPED | OUTPATIENT
Start: 2025-01-02

## 2025-02-10 ENCOUNTER — TELEPHONE (OUTPATIENT)
Dept: PRIMARY CARE | Facility: CLINIC | Age: 89
End: 2025-02-10
Payer: MEDICARE

## 2025-02-10 DIAGNOSIS — E04.2 MULTIPLE THYROID NODULES: Primary | ICD-10-CM

## 2025-03-07 ENCOUNTER — APPOINTMENT (OUTPATIENT)
Facility: CLINIC | Age: 89
End: 2025-03-07
Payer: MEDICARE

## 2025-03-07 VITALS — WEIGHT: 147 LBS | BODY MASS INDEX: 30.86 KG/M2 | HEIGHT: 58 IN

## 2025-03-07 DIAGNOSIS — E04.2 MULTIPLE THYROID NODULES: ICD-10-CM

## 2025-03-07 DIAGNOSIS — R26.89 IMBALANCE: ICD-10-CM

## 2025-03-07 PROCEDURE — 1159F MED LIST DOCD IN RCRD: CPT | Performed by: OTOLARYNGOLOGY

## 2025-03-07 PROCEDURE — 1160F RVW MEDS BY RX/DR IN RCRD: CPT | Performed by: OTOLARYNGOLOGY

## 2025-03-07 PROCEDURE — 1123F ACP DISCUSS/DSCN MKR DOCD: CPT | Performed by: OTOLARYNGOLOGY

## 2025-03-07 PROCEDURE — 1036F TOBACCO NON-USER: CPT | Performed by: OTOLARYNGOLOGY

## 2025-03-07 PROCEDURE — 99204 OFFICE O/P NEW MOD 45 MIN: CPT | Performed by: OTOLARYNGOLOGY

## 2025-03-07 ASSESSMENT — PATIENT HEALTH QUESTIONNAIRE - PHQ9
2. FEELING DOWN, DEPRESSED OR HOPELESS: SEVERAL DAYS
1. LITTLE INTEREST OR PLEASURE IN DOING THINGS: SEVERAL DAYS
SUM OF ALL RESPONSES TO PHQ9 QUESTIONS 1 AND 2: 2

## 2025-03-11 DIAGNOSIS — E78.2 MIXED HYPERLIPIDEMIA: ICD-10-CM

## 2025-03-12 DIAGNOSIS — R26.89 IMBALANCE: ICD-10-CM

## 2025-03-12 RX ORDER — EZETIMIBE 10 MG/1
10 TABLET ORAL DAILY
Qty: 90 TABLET | Refills: 3 | Status: SHIPPED | OUTPATIENT
Start: 2025-03-12

## 2025-04-01 ENCOUNTER — APPOINTMENT (OUTPATIENT)
Dept: PRIMARY CARE | Facility: CLINIC | Age: 89
End: 2025-04-01
Payer: MEDICARE

## 2025-04-08 ENCOUNTER — APPOINTMENT (OUTPATIENT)
Dept: PRIMARY CARE | Facility: CLINIC | Age: 89
End: 2025-04-08
Payer: MEDICARE

## 2025-04-08 VITALS
HEART RATE: 82 BPM | DIASTOLIC BLOOD PRESSURE: 84 MMHG | SYSTOLIC BLOOD PRESSURE: 143 MMHG | WEIGHT: 145.2 LBS | BODY MASS INDEX: 30.48 KG/M2 | TEMPERATURE: 95.9 F | HEIGHT: 58 IN

## 2025-04-08 DIAGNOSIS — F32.A DEPRESSIVE DISORDER: Primary | ICD-10-CM

## 2025-04-08 DIAGNOSIS — R42 VERTIGO: ICD-10-CM

## 2025-04-08 DIAGNOSIS — E04.2 MULTIPLE THYROID NODULES: ICD-10-CM

## 2025-04-08 DIAGNOSIS — E05.90 SUBCLINICAL HYPERTHYROIDISM: ICD-10-CM

## 2025-04-08 PROCEDURE — G8433 SCR FOR DEP NOT CPT DOC RSN: HCPCS | Performed by: INTERNAL MEDICINE

## 2025-04-08 PROCEDURE — 1158F ADVNC CARE PLAN TLK DOCD: CPT | Performed by: INTERNAL MEDICINE

## 2025-04-08 PROCEDURE — 3077F SYST BP >= 140 MM HG: CPT | Performed by: INTERNAL MEDICINE

## 2025-04-08 PROCEDURE — G2211 COMPLEX E/M VISIT ADD ON: HCPCS | Performed by: INTERNAL MEDICINE

## 2025-04-08 PROCEDURE — 1123F ACP DISCUSS/DSCN MKR DOCD: CPT | Performed by: INTERNAL MEDICINE

## 2025-04-08 PROCEDURE — 1159F MED LIST DOCD IN RCRD: CPT | Performed by: INTERNAL MEDICINE

## 2025-04-08 PROCEDURE — 1036F TOBACCO NON-USER: CPT | Performed by: INTERNAL MEDICINE

## 2025-04-08 PROCEDURE — 3079F DIAST BP 80-89 MM HG: CPT | Performed by: INTERNAL MEDICINE

## 2025-04-08 PROCEDURE — 1160F RVW MEDS BY RX/DR IN RCRD: CPT | Performed by: INTERNAL MEDICINE

## 2025-04-08 PROCEDURE — 99213 OFFICE O/P EST LOW 20 MIN: CPT | Performed by: INTERNAL MEDICINE

## 2025-04-08 RX ORDER — SERTRALINE HYDROCHLORIDE 50 MG/1
50 TABLET, FILM COATED ORAL DAILY
Qty: 90 TABLET | Refills: 1 | Status: SHIPPED | OUTPATIENT
Start: 2025-04-08 | End: 2025-10-05

## 2025-04-08 ASSESSMENT — ENCOUNTER SYMPTOMS
SHORTNESS OF BREATH: 0
VOMITING: 0
DIZZINESS: 1
AGITATION: 0
CONSTIPATION: 0
ABDOMINAL PAIN: 0
CONFUSION: 0
SORE THROAT: 0
DIARRHEA: 0
COUGH: 0
CHILLS: 0
FEVER: 0
LIGHT-HEADEDNESS: 0
NAUSEA: 0

## 2025-04-08 NOTE — PROGRESS NOTES
Subjective   Patient ID: Geena Colorado is a 89 y.o. female who presents for Depression (She used to be on Sertraline, but was switched to Lexapro because she was having diarrhea.  She thought Lexapro was making her dizzy so she stopped it.  This is was stopped in December.  She wants to go back on Sertraline.  ).    HPI  Patient took the Lexapro for 1 month. She stopped it because she thought it was making her dizzy.  Dizziness continues despite stopping the medication.  She is now scheduled for vestibular testing as recommended by ENT..  Patient saw Dr. Keanu Venegas ENT for thyroid nodules.  While at that appointment he had referred the patient for vestibular testing and already has a order for vestibular rehab.  But main reason she is here today is because she would like to go back on sertraline.  She has stopped sertraline and wanted to switch to Lexapro because she believes sertraline was causing her diarrhea.  We looked up her sertraline we had mixed information in terms of it can have up to 20% cases of diarrhea versus 2%.  Up-to-date list that is more likely to cause diarrhea than other SSRIs.  At this point she would like to do a trial back on the medication.  Does have a history of depression and crying spells.  No suicidal or homicidal ideation.  She will be due for labs prior to next office visit.    Review of Systems   Constitutional:  Negative for chills and fever.   HENT:  Negative for sore throat.    Eyes:  Negative for visual disturbance.   Respiratory:  Negative for cough and shortness of breath.    Cardiovascular:  Negative for chest pain.   Gastrointestinal:  Negative for abdominal pain, constipation, diarrhea, nausea and vomiting.   Neurological:  Positive for dizziness. Negative for syncope and light-headedness.   Psychiatric/Behavioral:  Negative for agitation and confusion.        Objective   /84 (BP Location: Left arm, Patient Position: Sitting, BP Cuff Size: Adult)   Pulse 82    "Temp 35.5 °C (95.9 °F)   Ht 1.473 m (4' 10\")   Wt 65.9 kg (145 lb 3.2 oz)   BMI 30.35 kg/m²     Physical Exam  Vitals and nursing note reviewed.   Constitutional:       General: She is not in acute distress.     Appearance: Normal appearance. She is obese. She is not ill-appearing, toxic-appearing or diaphoretic.   HENT:      Head: Normocephalic and atraumatic.   Cardiovascular:      Rate and Rhythm: Normal rate and regular rhythm.      Heart sounds: Normal heart sounds.   Pulmonary:      Effort: Pulmonary effort is normal. No respiratory distress.      Breath sounds: Normal breath sounds. No stridor.   Abdominal:      General: There is no distension.      Palpations: Abdomen is soft.      Tenderness: There is no abdominal tenderness.   Musculoskeletal:      Right lower leg: No edema.      Left lower leg: No edema.   Lymphadenopathy:      Cervical: No cervical adenopathy.   Skin:     General: Skin is warm and dry.      Coloration: Skin is not jaundiced or pale.      Findings: No rash.   Neurological:      General: No focal deficit present.      Mental Status: She is alert and oriented to person, place, and time.      Cranial Nerves: No cranial nerve deficit.   Psychiatric:         Mood and Affect: Mood normal.         Behavior: Behavior normal.         Thought Content: Thought content normal.         Judgment: Judgment normal.         Assessment/Plan   Problem List Items Addressed This Visit             ICD-10-CM    Vertigo R42    Depressive disorder - Primary F32.A    Relevant Medications    sertraline (Zoloft) 50 mg tablet    Subclinical hyperthyroidism E05.90    Multiple thyroid nodules E04.2     Depressive disorder: Patient was restarted on sertraline we will start off with half a tablet or 25 mg for the first 2 to 4 weeks and then increase to 50 mg of the medication this is what she was on previously.  She will watch for symptoms of diarrhea.  If they start with the Zoloft she will let us know she can use " Imodium and see if the diarrhea passes.  Keep follow-up appointment for 1 month from now    Vertigo: She has testing for further evaluation of her vertigo ordered by ENT and also has order for physical therapy vestibular therapy    Subclinical hyperthyroidism: Currently does not have signs or symptoms of hyperthyroidism is scheduled to have repeat labs prior to next office visit in 1 month    Multiple thyroid nodules: At this point patient does have a nodule that it is a candidate for biopsy but she wishes to hold off on biopsy at this time and has chosen surveillance.

## 2025-04-26 LAB
25(OH)D3+25(OH)D2 SERPL-MCNC: 53 NG/ML (ref 30–100)
ALBUMIN SERPL-MCNC: 4.5 G/DL (ref 3.6–5.1)
ALP SERPL-CCNC: 51 U/L (ref 37–153)
ALT SERPL-CCNC: 11 U/L (ref 6–29)
ANION GAP SERPL CALCULATED.4IONS-SCNC: 11 MMOL/L (CALC) (ref 7–17)
AST SERPL-CCNC: 16 U/L (ref 10–35)
BASOPHILS # BLD AUTO: 120 CELLS/UL (ref 0–200)
BASOPHILS NFR BLD AUTO: 1.3 %
BILIRUB SERPL-MCNC: 0.6 MG/DL (ref 0.2–1.2)
BUN SERPL-MCNC: 23 MG/DL (ref 7–25)
CALCIUM SERPL-MCNC: 9.7 MG/DL (ref 8.6–10.4)
CHLORIDE SERPL-SCNC: 103 MMOL/L (ref 98–110)
CHOLEST SERPL-MCNC: 184 MG/DL
CHOLEST/HDLC SERPL: 2.2 (CALC)
CO2 SERPL-SCNC: 23 MMOL/L (ref 20–32)
CREAT SERPL-MCNC: 0.94 MG/DL (ref 0.6–0.95)
EGFRCR SERPLBLD CKD-EPI 2021: 58 ML/MIN/1.73M2
EOSINOPHIL # BLD AUTO: 405 CELLS/UL (ref 15–500)
EOSINOPHIL NFR BLD AUTO: 4.4 %
ERYTHROCYTE [DISTWIDTH] IN BLOOD BY AUTOMATED COUNT: 12.8 % (ref 11–15)
EST. AVERAGE GLUCOSE BLD GHB EST-MCNC: 120 MG/DL
EST. AVERAGE GLUCOSE BLD GHB EST-SCNC: 6.6 MMOL/L
GLUCOSE SERPL-MCNC: 128 MG/DL (ref 65–99)
HBA1C MFR BLD: 5.8 %
HCT VFR BLD AUTO: 44 % (ref 35–45)
HDLC SERPL-MCNC: 85 MG/DL
HGB BLD-MCNC: 14.5 G/DL (ref 11.7–15.5)
LDLC SERPL CALC-MCNC: 81 MG/DL (CALC)
LYMPHOCYTES # BLD AUTO: 3146 CELLS/UL (ref 850–3900)
LYMPHOCYTES NFR BLD AUTO: 34.2 %
MCH RBC QN AUTO: 29.8 PG (ref 27–33)
MCHC RBC AUTO-ENTMCNC: 33 G/DL (ref 32–36)
MCV RBC AUTO: 90.3 FL (ref 80–100)
MONOCYTES # BLD AUTO: 699 CELLS/UL (ref 200–950)
MONOCYTES NFR BLD AUTO: 7.6 %
NEUTROPHILS # BLD AUTO: 4830 CELLS/UL (ref 1500–7800)
NEUTROPHILS NFR BLD AUTO: 52.5 %
NONHDLC SERPL-MCNC: 99 MG/DL (CALC)
PLATELET # BLD AUTO: 290 THOUSAND/UL (ref 140–400)
PMV BLD REES-ECKER: 9.8 FL (ref 7.5–12.5)
POTASSIUM SERPL-SCNC: 4.4 MMOL/L (ref 3.5–5.3)
PROT SERPL-MCNC: 7 G/DL (ref 6.1–8.1)
RBC # BLD AUTO: 4.87 MILLION/UL (ref 3.8–5.1)
SODIUM SERPL-SCNC: 137 MMOL/L (ref 135–146)
TRIGL SERPL-MCNC: 98 MG/DL
TSH SERPL-ACNC: 0.45 MIU/L (ref 0.4–4.5)
WBC # BLD AUTO: 9.2 THOUSAND/UL (ref 3.8–10.8)

## 2025-05-02 ENCOUNTER — APPOINTMENT (OUTPATIENT)
Dept: AUDIOLOGY | Facility: CLINIC | Age: 89
End: 2025-05-02
Payer: MEDICARE

## 2025-05-05 ENCOUNTER — APPOINTMENT (OUTPATIENT)
Dept: PRIMARY CARE | Facility: CLINIC | Age: 89
End: 2025-05-05
Payer: MEDICARE

## 2025-05-05 VITALS
TEMPERATURE: 97.7 F | WEIGHT: 145.6 LBS | SYSTOLIC BLOOD PRESSURE: 130 MMHG | HEIGHT: 59 IN | DIASTOLIC BLOOD PRESSURE: 80 MMHG | BODY MASS INDEX: 29.35 KG/M2 | HEART RATE: 64 BPM

## 2025-05-05 DIAGNOSIS — E55.9 VITAMIN D DEFICIENCY: ICD-10-CM

## 2025-05-05 DIAGNOSIS — E66.3 OVERWEIGHT (BMI 25.0-29.9): ICD-10-CM

## 2025-05-05 DIAGNOSIS — I10 PRIMARY HYPERTENSION: Primary | ICD-10-CM

## 2025-05-05 DIAGNOSIS — E05.90 SUBCLINICAL HYPERTHYROIDISM: ICD-10-CM

## 2025-05-05 DIAGNOSIS — E04.2 MULTIPLE THYROID NODULES: ICD-10-CM

## 2025-05-05 DIAGNOSIS — R73.09 IMPAIRED GLUCOSE METABOLISM: ICD-10-CM

## 2025-05-05 DIAGNOSIS — M81.0 OSTEOPOROSIS, UNSPECIFIED OSTEOPOROSIS TYPE, UNSPECIFIED PATHOLOGICAL FRACTURE PRESENCE: ICD-10-CM

## 2025-05-05 DIAGNOSIS — Z87.891 FORMER SMOKER: ICD-10-CM

## 2025-05-05 DIAGNOSIS — E78.2 MIXED HYPERLIPIDEMIA: ICD-10-CM

## 2025-05-05 DIAGNOSIS — F32.A DEPRESSIVE DISORDER: ICD-10-CM

## 2025-05-05 PROCEDURE — 3079F DIAST BP 80-89 MM HG: CPT | Performed by: INTERNAL MEDICINE

## 2025-05-05 PROCEDURE — 1123F ACP DISCUSS/DSCN MKR DOCD: CPT | Performed by: INTERNAL MEDICINE

## 2025-05-05 PROCEDURE — 99214 OFFICE O/P EST MOD 30 MIN: CPT | Performed by: INTERNAL MEDICINE

## 2025-05-05 PROCEDURE — G2211 COMPLEX E/M VISIT ADD ON: HCPCS | Performed by: INTERNAL MEDICINE

## 2025-05-05 PROCEDURE — 1036F TOBACCO NON-USER: CPT | Performed by: INTERNAL MEDICINE

## 2025-05-05 PROCEDURE — 1158F ADVNC CARE PLAN TLK DOCD: CPT | Performed by: INTERNAL MEDICINE

## 2025-05-05 PROCEDURE — 1160F RVW MEDS BY RX/DR IN RCRD: CPT | Performed by: INTERNAL MEDICINE

## 2025-05-05 PROCEDURE — 3075F SYST BP GE 130 - 139MM HG: CPT | Performed by: INTERNAL MEDICINE

## 2025-05-05 PROCEDURE — 1159F MED LIST DOCD IN RCRD: CPT | Performed by: INTERNAL MEDICINE

## 2025-05-05 RX ORDER — SERTRALINE HYDROCHLORIDE 50 MG/1
25 TABLET, FILM COATED ORAL DAILY
COMMUNITY
Start: 2025-05-05

## 2025-05-05 ASSESSMENT — ENCOUNTER SYMPTOMS
DIZZINESS: 0
NAUSEA: 0
CHILLS: 0
BLOOD IN STOOL: 0
PALPITATIONS: 0
COUGH: 0
AGITATION: 0
SHORTNESS OF BREATH: 0
ABDOMINAL PAIN: 0
DYSURIA: 0
DIARRHEA: 0
FEVER: 0
HEMATURIA: 0
VOMITING: 0
LIGHT-HEADEDNESS: 0
SORE THROAT: 0
CONFUSION: 0

## 2025-05-05 ASSESSMENT — PATIENT HEALTH QUESTIONNAIRE - PHQ9
SUM OF ALL RESPONSES TO PHQ9 QUESTIONS 1 AND 2: 0
2. FEELING DOWN, DEPRESSED OR HOPELESS: NOT AT ALL
1. LITTLE INTEREST OR PLEASURE IN DOING THINGS: NOT AT ALL

## 2025-05-05 NOTE — PROGRESS NOTES
Subjective   Patient ID: Geena Colorado is a 89 y.o. female who presents for Follow-up and Results (Lab review).  History of Present Illness  Geena Colorado is an 89 year old female who presents for routine follow-up and lab review.    Her recent blood work shows an A1c of 5.8%, indicating prediabetes, and a fasting blood sugar of 128 mg/dL. She is currently taking atenolol and atorvastatin for blood pressure and cholesterol management, respectively. She was recently restarted on sertraline at a dose of 25 mg, which she is cutting from a 50 mg tablet, and feels well on this dose.  Depression feels controlled with the current dose.    Her thyroid function tests, blood counts, kidney function, electrolytes, liver enzymes, and cholesterol levels are all within normal limits. She has a history of a thyroid nodule, for which she is under the care of Dr. Thomas.    She has a history of osteoporosis, confirmed by a previous DEXA scan. She is not pursuing treatment for osteoporosis but ensures her vitamin D and calcium levels are adequate. She takes vitamin D supplements, recently switching to a pill form from a gel cap, with a dose of 2000 IU, which maintains her vitamin D levels within the normal range.    Socially, she lives at home and recently let go of her . She has a history of cataract surgery with lens implants for distance and reading.    No abdominal pain or swelling. No recent dermatological issues noted.    Review of Systems   Constitutional:  Negative for chills and fever.   HENT:  Negative for sore throat.    Eyes:  Negative for visual disturbance.   Respiratory:  Negative for cough and shortness of breath.    Cardiovascular:  Negative for chest pain, palpitations and leg swelling.   Gastrointestinal:  Negative for abdominal pain, blood in stool, diarrhea, nausea and vomiting.   Genitourinary:  Negative for dysuria and hematuria.   Skin:  Negative for rash.   Neurological:  Negative for  "dizziness, syncope and light-headedness.   Psychiatric/Behavioral:  Negative for agitation and confusion.        Objective     /80 (BP Location: Left arm, Patient Position: Sitting, BP Cuff Size: Adult)   Pulse 64   Temp 36.5 °C (97.7 °F)   Ht 1.499 m (4' 11\")   Wt 66 kg (145 lb 9.6 oz)   BMI 29.41 kg/m²      Physical Exam  Vitals and nursing note reviewed.   Constitutional:       General: She is not in acute distress.     Appearance: Normal appearance. She is not ill-appearing, toxic-appearing or diaphoretic.   HENT:      Head: Normocephalic and atraumatic.      Nose: No rhinorrhea.      Mouth/Throat:      Mouth: Mucous membranes are moist.      Pharynx: Oropharynx is clear. No oropharyngeal exudate or posterior oropharyngeal erythema.   Eyes:      Extraocular Movements: Extraocular movements intact.      Pupils: Pupils are equal, round, and reactive to light.   Cardiovascular:      Rate and Rhythm: Normal rate and regular rhythm.      Heart sounds: Normal heart sounds.   Pulmonary:      Effort: Pulmonary effort is normal. No respiratory distress.      Breath sounds: Normal breath sounds. No wheezing, rhonchi or rales.   Abdominal:      General: There is no distension.      Palpations: Abdomen is soft. There is no mass.      Tenderness: There is no abdominal tenderness. There is no guarding.   Musculoskeletal:      Cervical back: Neck supple.      Right lower leg: No edema.      Left lower leg: No edema.   Skin:     General: Skin is warm and dry.      Coloration: Skin is not jaundiced or pale.      Findings: No lesion or rash.   Neurological:      General: No focal deficit present.      Mental Status: She is alert and oriented to person, place, and time. Mental status is at baseline.   Psychiatric:         Mood and Affect: Mood normal.         Behavior: Behavior normal.         Thought Content: Thought content normal.         Judgment: Judgment normal.        Physical Exam  VITALS: BP- " "130/80  MEASUREMENTS: Height- 6'3\".    Assessment & Plan    Blood pressure is well-controlled at 130/80 mmHg. Laboratory results show A1c at 5.8%, indicating pre-diabetes, and fasting glucose at 128 mg/dL. Thyroid function, blood counts, kidney function, electrolytes, liver enzymes, and cholesterol levels are within normal limits. She is on atenolol and atorvastatin for hypertension and hyperlipidemia, respectively. Vaccinations are up to date, except for the new shingles vaccine, which she is hesitant to receive due to previous adverse reactions.  - Continue atenolol and atorvastatin.  - Monitor blood glucose levels and dietary intake, focusing on reducing carbohydrate consumption.  - Encourage a balanced diet with green leafy vegetables and protein.  - Discuss the potential benefits and risks of the new shingles vaccine.    Pre-diabetes  A1c is at 5.8%, indicating pre-diabetes. Fasting glucose is slightly elevated at 128 mg/dL. She is advised to monitor carbohydrate intake, particularly rice, bread, and potatoes, to manage blood sugar levels. Artificial sweeteners may increase appetite and contribute to heart disease, so their use should be minimized.  - Monitor carbohydrate intake, focusing on reducing rice, bread, and potatoes.  - Encourage a balanced diet with green leafy vegetables and protein.  - Consider low-sugar protein drinks as a dietary supplement.  - Minimize use of artificial sweeteners.    Osteoporosis  Osteoporosis was noted on the last DEXA scan. She is currently not on treatment and is hesitant to start medication due to potential side effects. Vitamin D levels are adequate, and she is advised to maintain calcium and vitamin D intake. She prefers to focus on fall prevention and is aware of the option to consult a rheumatologist for further treatment if desired.  - Maintain adequate calcium and vitamin D intake.  - Discuss potential osteoporosis treatments with a rheumatologist if she decides to " pursue treatment in the future.  - Advise on fall prevention strategies.    Depressive disorder  She is currently on sertraline 25 mg, which is providing adequate symptom control. She was initially prescribed 50 mg but is doing well on the lower dose and prefers to continue at this level.  - Continue sertraline 25 mg daily.  - Monitor symptoms and adjust dosage if necessary.    Hyperlipidemia: Chronic, stable.  Continue current medication regimen of atorvastatin    Subclinical hypothyroidism I will recheck a TSH level        Results  LABS  A1c: 5.8%  Fasting glucose: 128 mg/dL    RADIOLOGY  Thyroid ultrasound: Met criteria for biopsy; slow growing    Problem List Items Addressed This Visit       Impaired glucose metabolism    Relevant Orders    Hemoglobin A1C    Osteoporosis    Vitamin D deficiency    Relevant Orders    Vitamin D 25-Hydroxy,Total (for eval of Vitamin D levels)    Former smoker    Depressive disorder    Relevant Medications    sertraline (Zoloft) 50 mg tablet    Hyperlipidemia    Relevant Orders    CBC and Auto Differential    Comprehensive Metabolic Panel    Lipid Panel    High blood pressure - Primary    Relevant Orders    CBC and Auto Differential    Comprehensive Metabolic Panel    Subclinical hyperthyroidism    Relevant Orders    TSH with reflex to Free T4 if abnormal    Multiple thyroid nodules         Osmar Hinds, DO     This medical note was created with the assistance of artificial intelligence (AI) for documentation purposes. The content has been reviewed and confirmed by the healthcare provider for accuracy and completeness. Patient consented to the use of audio recording and use of AI during their visit.

## 2025-05-06 NOTE — PROGRESS NOTES
"  HEARING TEST & BALANCE FUNCTION TEST (BFT)      Name:  Geena Colorado  :  1935  Age:  89 y.o.  Date of Evaluation:  2025     HISTORY  Patient was seen for a videonystagmography (VNG) with video head impulse testing (vHIT) and vestibular myogenic potential (VEMP) testing on order from Keanu Venegas MD due to a history of dizziness/imbalance. Vestibular case history collected via patient-clinician interview and patient chart review.    - She has had imbalance that she described as \"walking in a fog\" for approximately 2 years. This is constant while she is walking.   - Her symptoms are alleviated by sitting.   - She reported dizziness when she lays on her left side. Her dizziness goes away when she moves her head.   - She had episodes of dizziness when she was younger that caused vomiting.   - She reported decreased hearing in both ears that is worse in the right ear. This has been gradual over several years.   - She has buzzing tinnitus in both ears.   - She has a history of cataract surgery.  - She denied vertigo, aural fullness, previous otologic surgery, otalgia, history of neck/back surgery and use of anti-vertigo and anti-nausea medications.   - She uses a cane to ambulate.     EVALUATION    OTOSCOPY  Moderate amount of cerumen in both ears. Cerumen was removed bilaterally without incident. Otoscopy confirmed clear canals after procedure.     TYMPANOMETRY  Right ear: Type A, normal ear canal volume and compliance.  Left ear: Type A, normal ear canal volume and compliance.     ACOUSTIC REFLEXES  Right ear: Could not test due to patient movement  Left ear: Could not test due to patient movement    AUDIOMETRIC EVALUATION  Right ear: mild sloping to severe sensorineural hearing loss. Word recognition ability estimated to be poor(52%) at 95 dB HL based on an NU-6 recorded 25-word list.  Left ear: mild sloping to severe sensorineural hearing loss. Word recognition ability estimated to be good(84%) at " 85 dB HL based on an NU-6 recorded 25-word list.    The test results were discussed with the patient and their daughter.     BEDSIDE ASSESSMENT TESTING  The bedside assessment is an optional portion of the test battery to further assist in differential diagnosis and screen for eye abnormalities which may affect testing.    Extra-Ocular Range of Motion: normal. Extra-Ocular Range of Motion is performed to evaluate any eye abnormalities prior to testing.  Cover/Uncover: normal. Cover/Uncover test is performed to evaluate for skewed deviation of the eyes prior to testing.  Cervical Neck Exam: normal. Cervical Neck is performed to evaluate any restrictions or pain with neck movement prior to testing.  Vertebral Artery Screen: normal. Vertebral Artery Screen is performed to evaluate for vertebrobasilar insufficiency prior to testing.   Ocular Counter-Roll: normal. Ocular Counter-Roll is performed to evaluate ocular tilt reaction and assess otolith organ function prior to testing.    VIDEO HEAD IMPULSE TEST (vHIT)  The vHIT procedure provides objective assessment of the high frequency vestibulo-ocular reflex (VOR) for each semicircular canal. Rapid, random horizontal and vertical thrusts are applied to the patient's head to provoke the VOR. The vHIT procedure includes two separate paradigms: Head Impulse Paradigm (HIMP) and Suppression Head Impulse Paradigm (SHIMP). SHIMP is an optional paradigm that is not appropriate to perform for every patient. However, it is appropriate to perform SHIMP when there is verified evidence of possible vestibulopathy in the traditional HIMP test.     Head Impulse Paradigm (HIMP)   Right Ear   Canal Gain Overt Saccades Covert Saccades   Lateral 0.77 present absent   Anterior 0.93 present absent   Posterior 0.38 absent present        Head Impulse Paradigm (HIMP)   Left Ear   Canal Gain Overt Saccades Covert Saccades   Lateral 0.61 present absent   Anterior 1.00 absent absent   Posterior 0.86  absent absent     Total gain was within normal limits in the right anterior, left anterior, and left posterior semicircular canals. Total gain was below normal limits in the right lateral, right posterior, and left lateral semicircular canals (<0.80 is abnormal for lateral, <0.70 is abnormal for vertical). Overt saccades were present when stimulating the right anterior, right lateral, and left lateral semicircular canals. Covert saccades were present when stimulating the right posterior canal.     VIDEONYSTAGMOGRAPHY (VNG) TESTING  VNG provides objective indications of peripheral and central vestibulo-ocular pathway involvement. Ocular motor testing to visually guided targets is conducted using a dual channel video-recording technique for the recording of eye movement in the horizontal and vertical planes. Air caloric testing is performed at 48 degrees C and 24 degrees C.    Spontaneous Nystagmus test was absent. Spontaneous nystagmus testing may help with the identification of an acute or uncompensated peripheral vestibular lesion.   Gaze Nystagmus test was normal. Gaze nystagmus testing is to evaluate for nystagmus that is evoked by holding eye gaze in any particular direction. True gaze nystagmus is amplified when vision is denied.   Smooth Pursuit/Tracking test was abnormal given low gain and asymmetry (leftward eye movements weaker) of smooth pursuit tracking for leftward eye movements. Test repeated for best performance. Smooth pursuit/tracking testing is to evaluate the ability to move eyes with a single smoothly moving target.   Random Saccades test was abnormal given consistent poor accuracy of saccadic targets for leftward eye movements. Test repeated for best performance. Random saccade testing is to evaluate patient's ability to make fast random eye movements along a horizontal moving target.   Optokinetic nystagmus testing was normal. This full-field OPK test is to evaluate the ability of central  "nervous system to stabilize vision during sustained head movement after the VOR system loses effectiveness.   Tim-Hallpike testing was normal. No nystagmus visualized. Patient reported immediate dizziness when laying back into supine position with the head left. Nystagmus not visualized due to patient difficulty opening her eyes. Dizziness fatigued quickly. BPPV diagnosis cannot be made given eyes closed. [place]-Hallpike testing is to provide a diagnosis of Benign Paroxysmal Positional Vertigo (BPPV) of the vertical semicircular canals on the side which is most affected.  Roll testing was normal. Patient reported dizziness in the head left position. No nystagmus visualized. Result not consistent with type I BPPV. Roll testing is to provide a diagnosis of Benign Paroxysmal Positional Vertigo (BPPV) of the horizontal semicircular canals on the side which is most affected.  Positional testing was normal. Positional testing is to evaluate patient's ability to hold a steady gaze while in different positions.  [place] caloric testing was abnormal. Bilateral caloric weakness observed (less than 12 deg/sec SPV nystagmus total in both ears). Of note, \"wrong way\" nystagmus noted for left cool irrigations. Caloric testing is to evaluate for peripheral vestibular lesion.    IMPRESSIONS  Peripheral vestibular involvement given the followin) low gain and present saccades on vHIT testing bilaterally, and 2) patient report of dizziness during positional testing. Note that current clinical balance tests cannot distinguish between lesions of the labyrinth, VIIIth nerve, or root entry zone of the brainstem vestibular nuclei, thus the phrase peripheral refers to all of the structures.     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 Further medical intervention is warranted.     Her hearing test showed a symmetrical mild sloping to severe sensorineural hearing loss in both ears with asymmetric word understanding (right ear poorer). The patient was informed that they are a candidate for binaural amplification. They were encouraged to contact their insurance provider to inquire about a possible hearing aid benefit and where it can be used. If they do not have a hearing aid benefit or wish to obtain hearing aids through our center, they were encouraged to schedule a hearing aid consult appointment at their earliest convenience.     The patient was given a copy of today's audiogram with these recommendations.     Raw data reviewed by a member of the Vestibular & Balance Disorders team. All patient questions were answered.    RECOMMENDATIONS  Consider re-evaluation as medically indicated.  Consider physical therapy for positional vertigo treatment  Consider medical follow-up into central vestibular involvement  Continue monitoring per ENT/PCP preference.    Time: 9959-2571    GALILEO Vega, CCC-A  Licensed Audiologist

## 2025-05-09 ENCOUNTER — APPOINTMENT (OUTPATIENT)
Dept: AUDIOLOGY | Facility: CLINIC | Age: 89
End: 2025-05-09
Payer: MEDICARE

## 2025-05-09 ENCOUNTER — CLINICAL SUPPORT (OUTPATIENT)
Dept: AUDIOLOGY | Facility: CLINIC | Age: 89
End: 2025-05-09
Payer: MEDICARE

## 2025-05-09 DIAGNOSIS — H90.3 SENSORINEURAL HEARING LOSS, BILATERAL: ICD-10-CM

## 2025-05-09 DIAGNOSIS — H83.8X9 OTHER SPECIFIED DISEASES OF INNER EAR, UNSPECIFIED EAR: Primary | ICD-10-CM

## 2025-05-09 DIAGNOSIS — R26.89 IMBALANCE: ICD-10-CM

## 2025-05-09 PROCEDURE — 92537 CALORIC VSTBLR TEST W/REC: CPT

## 2025-05-09 PROCEDURE — 92557 COMPREHENSIVE HEARING TEST: CPT

## 2025-05-09 PROCEDURE — 92540 BASIC VESTIBULAR EVALUATION: CPT

## 2025-05-09 PROCEDURE — 92700 UNLISTED ORL SERVICE/PX: CPT

## 2025-05-09 PROCEDURE — 92567 TYMPANOMETRY: CPT

## 2025-05-09 ASSESSMENT — PAIN - FUNCTIONAL ASSESSMENT: PAIN_FUNCTIONAL_ASSESSMENT: 0-10

## 2025-05-09 ASSESSMENT — PAIN SCALES - GENERAL: PAINLEVEL_OUTOF10: 0 - NO PAIN

## 2025-05-09 NOTE — LETTER
"May 12, 2025     Keanu Venegas MD  [id number]  Department Of Otolaryngology  [facility] [address]    Patient: Geena Colorado   YOB: 1935   Date of Visit: 2025       Dear Dr. Keanu Venegas MD:    Thank you for referring Geena Colorado to me for evaluation. Below are my notes for this consultation.  If you have questions, please do not hesitate to call me. I look forward to following your patient along with you.       Sincerely,     GALILEO Vega, CCC-A      CC: Osmar Hinds, DO  ______________________________________________________________________________________      HEARING TEST & BALANCE FUNCTION TEST (BFT)      Name:  Geena Colorado  :  1935  Age:  89 y.o.  Date of Evaluation:  2025     HISTORY  Patient was seen for a videonystagmography (VNG) with video head impulse testing (vHIT) and vestibular myogenic potential (VEMP) testing on order from Keanu Venegas MD due to a history of dizziness/imbalance. Vestibular case history collected via patient-clinician interview and patient chart review.    - She has had imbalance that she described as \"walking in a fog\" for approximately 2 years. This is constant while she is walking.   - Her symptoms are alleviated by sitting.   - She reported dizziness when she lays on her left side. Her dizziness goes away when she moves her head.   - She had episodes of dizziness when she was younger that caused vomiting.   - She reported decreased hearing in both ears that is worse in the right ear. This has been gradual over several years.   - She has buzzing tinnitus in both ears.   - She has a history of cataract surgery.  - She denied vertigo, aural fullness, previous otologic surgery, otalgia, history of neck/back surgery and use of anti-vertigo and anti-nausea medications.   - She uses a cane to ambulate.     EVALUATION    OTOSCOPY  Moderate amount of cerumen in both ears. Cerumen was removed bilaterally without incident. " Otoscopy confirmed clear canals after procedure.     TYMPANOMETRY  Right ear: Type A, normal ear canal volume and compliance.  Left ear: Type A, normal ear canal volume and compliance.     ACOUSTIC REFLEXES  Right ear: Could not test due to patient movement  Left ear: Could not test due to patient movement    AUDIOMETRIC EVALUATION  Right ear: mild sloping to severe sensorineural hearing loss. Word recognition ability estimated to be poor(52%) at 95 dB HL based on an NU-6 recorded 25-word list.  Left ear: mild sloping to severe sensorineural hearing loss. Word recognition ability estimated to be good(84%) at 85 dB HL based on an NU-6 recorded 25-word list.    The test results were discussed with the patient and their daughter.     BEDSIDE ASSESSMENT TESTING  The bedside assessment is an optional portion of the test battery to further assist in differential diagnosis and screen for eye abnormalities which may affect testing.    Extra-Ocular Range of Motion: normal. Extra-Ocular Range of Motion is performed to evaluate any eye abnormalities prior to testing.  Cover/Uncover: normal. Cover/Uncover test is performed to evaluate for skewed deviation of the eyes prior to testing.  Cervical Neck Exam: normal. Cervical Neck is performed to evaluate any restrictions or pain with neck movement prior to testing.  Vertebral Artery Screen: normal. Vertebral Artery Screen is performed to evaluate for vertebrobasilar insufficiency prior to testing.   Ocular Counter-Roll: normal. Ocular Counter-Roll is performed to evaluate ocular tilt reaction and assess otolith organ function prior to testing.    VIDEO HEAD IMPULSE TEST (vHIT)  The vHIT procedure provides objective assessment of the high frequency vestibulo-ocular reflex (VOR) for each semicircular canal. Rapid, random horizontal and vertical thrusts are applied to the patient's head to provoke the VOR. The vHIT procedure includes two separate paradigms: Head Impulse Paradigm  (HIMP) and Suppression Head Impulse Paradigm (SHIMP). SHIMP is an optional paradigm that is not appropriate to perform for every patient. However, it is appropriate to perform SHIMP when there is verified evidence of possible vestibulopathy in the traditional HIMP test.     Head Impulse Paradigm (HIMP)   Right Ear   Canal Gain Overt Saccades Covert Saccades   Lateral 0.77 present absent   Anterior 0.93 present absent   Posterior 0.38 absent present        Head Impulse Paradigm (HIMP)   Left Ear   Canal Gain Overt Saccades Covert Saccades   Lateral 0.61 present absent   Anterior 1.00 absent absent   Posterior 0.86 absent absent     Total gain was within normal limits in the right anterior, left anterior, and left posterior semicircular canals. Total gain was below normal limits in the right lateral, right posterior, and left lateral semicircular canals (<0.80 is abnormal for lateral, <0.70 is abnormal for vertical). Overt saccades were present when stimulating the right anterior, right lateral, and left lateral semicircular canals. Covert saccades were present when stimulating the right posterior canal.     VIDEONYSTAGMOGRAPHY (VNG) TESTING  VNG provides objective indications of peripheral and central vestibulo-ocular pathway involvement. Ocular motor testing to visually guided targets is conducted using a dual channel video-recording technique for the recording of eye movement in the horizontal and vertical planes. Air caloric testing is performed at 48 degrees C and 24 degrees C.    Spontaneous Nystagmus test was absent. Spontaneous nystagmus testing may help with the identification of an acute or uncompensated peripheral vestibular lesion.   Gaze Nystagmus test was normal. Gaze nystagmus testing is to evaluate for nystagmus that is evoked by holding eye gaze in any particular direction. True gaze nystagmus is amplified when vision is denied.   Smooth Pursuit/Tracking test was abnormal given low gain and asymmetry  "(leftward eye movements weaker) of smooth pursuit tracking for leftward eye movements. Test repeated for best performance. Smooth pursuit/tracking testing is to evaluate the ability to move eyes with a single smoothly moving target.   Random Saccades test was abnormal given consistent poor accuracy of saccadic targets for leftward eye movements. Test repeated for best performance. Random saccade testing is to evaluate patient's ability to make fast random eye movements along a horizontal moving target.   Optokinetic nystagmus testing was normal. This full-field OPK test is to evaluate the ability of central nervous system to stabilize vision during sustained head movement after the VOR system loses effectiveness.   Gruver-Hallpike testing was normal. No nystagmus visualized. Patient reported immediate dizziness when laying back into supine position with the head left. Nystagmus not visualized due to patient difficulty opening her eyes. Dizziness fatigued quickly. BPPV diagnosis cannot be made given eyes closed. Tim-Hallpike testing is to provide a diagnosis of Benign Paroxysmal Positional Vertigo (BPPV) of the vertical semicircular canals on the side which is most affected.  Roll testing was normal. Patient reported dizziness in the head left position. No nystagmus visualized. Result not consistent with type I BPPV. Roll testing is to provide a diagnosis of Benign Paroxysmal Positional Vertigo (BPPV) of the horizontal semicircular canals on the side which is most affected.  Positional testing was normal. Positional testing is to evaluate patient's ability to hold a steady gaze while in different positions.  Bithermal caloric testing was abnormal. Bilateral caloric weakness observed (less than 12 deg/sec SPV nystagmus total in both ears). Of note, \"wrong way\" nystagmus noted for left cool irrigations. Caloric testing is to evaluate for peripheral vestibular lesion.    IMPRESSIONS  Peripheral vestibular involvement given " the followin) low gain and present saccades on vHIT testing bilaterally, and 2) patient report of dizziness during positional testing. Note that current clinical balance tests cannot distinguish between lesions of the labyrinth, VIIIth nerve, or root entry zone of the brainstem vestibular nuclei, thus the phrase peripheral refers to all of the structures.     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 Further medical intervention is warranted.     Her hearing test showed a symmetrical mild sloping to severe sensorineural hearing loss in both ears with asymmetric word understanding (right ear poorer). The patient was informed that they are a candidate for binaural amplification. They were encouraged to contact their insurance provider to inquire about a possible hearing aid benefit and where it can be used. If they do not have a hearing aid benefit or wish to obtain hearing aids through our center, they were encouraged to schedule a hearing aid consult appointment at their earliest convenience.     The patient was given a copy of today's audiogram with these recommendations.     Raw data reviewed by a member of the Vestibular & Balance Disorders team. All patient questions were answered.    RECOMMENDATIONS  Consider re-evaluation as medically indicated.  Consider physical therapy for positional vertigo treatment  Consider further investigation into central vestibular involvement  Continue monitoring per ENT/PCP preference.    Time: 7860-6313    GALILEO Vega, CCC-A  Licensed Audiologist

## 2025-07-16 DIAGNOSIS — F32.A DEPRESSIVE DISORDER: ICD-10-CM

## 2025-07-18 RX ORDER — SERTRALINE HYDROCHLORIDE 50 MG/1
25 TABLET, FILM COATED ORAL DAILY
Qty: 45 TABLET | Refills: 1 | Status: SHIPPED | OUTPATIENT
Start: 2025-07-18

## 2025-08-30 DIAGNOSIS — I10 PRIMARY HYPERTENSION: ICD-10-CM

## 2025-09-02 DIAGNOSIS — F32.A DEPRESSIVE DISORDER: ICD-10-CM

## 2025-09-05 RX ORDER — AMLODIPINE BESYLATE 5 MG/1
5 TABLET ORAL DAILY
Qty: 90 TABLET | Refills: 3 | Status: SHIPPED | OUTPATIENT
Start: 2025-09-05

## 2025-09-05 RX ORDER — SERTRALINE HYDROCHLORIDE 50 MG/1
50 TABLET, FILM COATED ORAL DAILY
Qty: 90 TABLET | Refills: 1 | Status: SHIPPED | OUTPATIENT
Start: 2025-09-05

## 2025-11-03 ENCOUNTER — APPOINTMENT (OUTPATIENT)
Dept: PRIMARY CARE | Facility: CLINIC | Age: 89
End: 2025-11-03
Payer: MEDICARE

## 2025-11-13 ENCOUNTER — APPOINTMENT (OUTPATIENT)
Dept: PRIMARY CARE | Facility: CLINIC | Age: 89
End: 2025-11-13
Payer: MEDICARE